# Patient Record
Sex: FEMALE | Race: BLACK OR AFRICAN AMERICAN
[De-identification: names, ages, dates, MRNs, and addresses within clinical notes are randomized per-mention and may not be internally consistent; named-entity substitution may affect disease eponyms.]

---

## 2023-02-06 ENCOUNTER — HOSPITAL ENCOUNTER (OUTPATIENT)
Dept: HOSPITAL 82 - ED | Age: 58
Setting detail: OBSERVATION
LOS: 1 days | Discharge: HOME | DRG: 313 | End: 2023-02-07
Attending: INTERNAL MEDICINE | Admitting: INTERNAL MEDICINE
Payer: SELF-PAY

## 2023-02-06 VITALS — SYSTOLIC BLOOD PRESSURE: 149 MMHG | DIASTOLIC BLOOD PRESSURE: 90 MMHG

## 2023-02-06 VITALS — SYSTOLIC BLOOD PRESSURE: 150 MMHG | DIASTOLIC BLOOD PRESSURE: 77 MMHG

## 2023-02-06 VITALS — DIASTOLIC BLOOD PRESSURE: 86 MMHG | SYSTOLIC BLOOD PRESSURE: 151 MMHG

## 2023-02-06 VITALS — SYSTOLIC BLOOD PRESSURE: 133 MMHG | DIASTOLIC BLOOD PRESSURE: 78 MMHG

## 2023-02-06 VITALS — SYSTOLIC BLOOD PRESSURE: 157 MMHG | DIASTOLIC BLOOD PRESSURE: 98 MMHG

## 2023-02-06 VITALS — SYSTOLIC BLOOD PRESSURE: 177 MMHG | DIASTOLIC BLOOD PRESSURE: 93 MMHG

## 2023-02-06 VITALS — DIASTOLIC BLOOD PRESSURE: 87 MMHG | SYSTOLIC BLOOD PRESSURE: 140 MMHG

## 2023-02-06 VITALS — WEIGHT: 160.94 LBS | HEIGHT: 63 IN | BODY MASS INDEX: 28.52 KG/M2

## 2023-02-06 VITALS — DIASTOLIC BLOOD PRESSURE: 80 MMHG | SYSTOLIC BLOOD PRESSURE: 148 MMHG

## 2023-02-06 VITALS — DIASTOLIC BLOOD PRESSURE: 83 MMHG | SYSTOLIC BLOOD PRESSURE: 145 MMHG

## 2023-02-06 VITALS — DIASTOLIC BLOOD PRESSURE: 72 MMHG | SYSTOLIC BLOOD PRESSURE: 139 MMHG

## 2023-02-06 VITALS — SYSTOLIC BLOOD PRESSURE: 139 MMHG | DIASTOLIC BLOOD PRESSURE: 77 MMHG

## 2023-02-06 VITALS — DIASTOLIC BLOOD PRESSURE: 92 MMHG | SYSTOLIC BLOOD PRESSURE: 141 MMHG

## 2023-02-06 VITALS — DIASTOLIC BLOOD PRESSURE: 69 MMHG | SYSTOLIC BLOOD PRESSURE: 116 MMHG

## 2023-02-06 VITALS — DIASTOLIC BLOOD PRESSURE: 78 MMHG | SYSTOLIC BLOOD PRESSURE: 127 MMHG

## 2023-02-06 DIAGNOSIS — I10: ICD-10-CM

## 2023-02-06 DIAGNOSIS — F41.9: ICD-10-CM

## 2023-02-06 DIAGNOSIS — R07.9: Primary | ICD-10-CM

## 2023-02-06 DIAGNOSIS — J45.909: ICD-10-CM

## 2023-02-06 DIAGNOSIS — Z73.3: ICD-10-CM

## 2023-02-06 LAB
ALBUMIN SERPL-MCNC: 4.5 G/DL (ref 3.2–5)
ALP SERPL-CCNC: 70 U/L (ref 38–126)
ANION GAP SERPL CALCULATED.3IONS-SCNC: 11 MMOL/L
AST SERPL-CCNC: 29 U/L (ref 14–36)
BASOPHILS NFR BLD AUTO: 0.7 % (ref 0–3)
BUN SERPL-MCNC: 16 MG/DL (ref 7–17)
BUN/CREAT SERPL: 20
CHLORIDE SERPL-SCNC: 107 MMOL/L (ref 95–108)
CO2 SERPL-SCNC: 26 MMOL/L (ref 22–30)
CREAT SERPL-MCNC: 0.8 MG/DL (ref 0.5–1)
EOSINOPHIL NFR BLD AUTO: 4.8 % (ref 0–8)
ERYTHROCYTE [DISTWIDTH] IN BLOOD BY AUTOMATED COUNT: 12.8 % (ref 11.5–15.5)
HCT VFR BLD AUTO: 40.3 % (ref 37–47)
HGB BLD-MCNC: 13.2 G/DL (ref 12–16)
IMM GRANULOCYTES NFR BLD: 0.2 % (ref 0–5)
LYMPHOCYTES NFR BLD: 33.6 % (ref 15–41)
MCH RBC QN AUTO: 32.1 PG  CALC (ref 26–32)
MCHC RBC AUTO-ENTMCNC: 32.8 G/DL CAL (ref 32–36)
MCV RBC AUTO: 98.1 FL  CALC (ref 80–100)
MONOCYTES NFR BLD AUTO: 10.7 % (ref 2–13)
NEUTROPHILS # BLD AUTO: 2.1 THOU/UL (ref 2–7.15)
NEUTROPHILS NFR BLD AUTO: 50 % (ref 42–76)
PLATELET # BLD AUTO: 188 THOU/UL (ref 130–400)
POTASSIUM SERPL-SCNC: 4.2 MMOL/L (ref 3.5–5.1)
PROT SERPL-MCNC: 7.3 G/DL (ref 6.3–8.2)
RBC # BLD AUTO: 4.11 MILL/UL (ref 4.2–5.6)
SODIUM SERPL-SCNC: 139 MMOL/L (ref 137–146)

## 2023-02-06 PROCEDURE — G0378 HOSPITAL OBSERVATION PER HR: HCPCS

## 2023-02-06 NOTE — NUR
ER patient is received. report by [facility] ER Nurse. Alert and oriented patient
X3. She does not refer to chest pain at the time of writing this note. He is
oriented on admission, nursing plan and the medications he takes at home are
verified. Safety and fall precautions in [lace. Call light within in reach.

## 2023-02-06 NOTE — NUR
PT DOES NOT KNOW HER HOME MEDICATIONS. STATES SHE TOOK ALL HER MEDICATIONS THIS
AM. CALLED [facility] TO VERIFY HOME MED REC. MED REC COMPLETED.

## 2023-02-06 NOTE — NUR
RECEIVED REPORT FROM DAYSHIFT RN.  PT RESTING IN BED, SITTING ON THE RIGHT
SIDE OF IT, AWAKE, WATCHING TV.  ASSESSMENT COMPLETED.  PT IS A&OX3.  PT
IS ABLE TO COMMUNICATE NEEDS BUT ONLY IN English AND CREOLE.  PT DENIES ANY
PAIN OR DISCOMFORT AT THIS TIME.  ON ROOM AIR, BREATHING EVEN AND UNLABORED.
IV IS PATENT AND FLUSHES WELL.  ON TELEMETRY, MONITORED BY ED.  EDUCATED PT ON
HOW TO USE THE BED AND HELPED HER FIND A English CHANNEL ON TV.  CALL LIGHT
AND BEDSIDE TABLE WITHIN REACH, SAFETY PRECAUTIONS IN PLACE.

## 2023-02-06 NOTE — NUR
PT TRANSFERRED UP TO ROOM 274 VIA WHEELCHAIR, ON TELEMETRY, ACCOMPANIED BY SHARA LOONEY ED TECHKaren

## 2023-02-07 VITALS — DIASTOLIC BLOOD PRESSURE: 82 MMHG | SYSTOLIC BLOOD PRESSURE: 134 MMHG

## 2023-02-07 VITALS — SYSTOLIC BLOOD PRESSURE: 144 MMHG | DIASTOLIC BLOOD PRESSURE: 86 MMHG

## 2023-02-07 VITALS — SYSTOLIC BLOOD PRESSURE: 133 MMHG | DIASTOLIC BLOOD PRESSURE: 85 MMHG

## 2023-02-07 LAB
BILIRUB UR QL STRIP.AUTO: NEGATIVE
CHOLEST SERPL-MCNC: 181 MG/DL (ref 0–199)
CHOLEST/HDLC SERPL: 2.9 {RATIO}
COLOR UR AUTO: YELLOW
GLUCOSE UR STRIP.AUTO-MCNC: NEGATIVE MG/DL
HDLC SERPL-MCNC: 62 MG/DL (ref 39–59)
HGB UR QL STRIP.AUTO: NEGATIVE
KETONES UR STRIP.AUTO-MCNC: NEGATIVE MG/DL
LDLC SERPL CALC-MCNC: 103 MG/DL
LEUKOCYTE ESTERASE UR QL STRIP.AUTO: (no result)
MAGNESIUM SERPL-MCNC: 2.1 MG/DL (ref 1.6–2.3)
NITRITE UR QL STRIP.AUTO: NEGATIVE
PH UR STRIP.AUTO: 6.5 [PH] (ref 4.5–8)
PROT UR QL STRIP.AUTO: NEGATIVE MG/DL
SP GR UR STRIP.AUTO: 1.01
TRIGL SERPL-MCNC: 78 MG/DL (ref 0–149)
UROBILINOGEN UR QL STRIP.AUTO: 0.2 E.U./DL
VLDLC SERPL CALC-MCNC: 16 MG/DL

## 2023-02-07 NOTE — NUR
PT RESTING IN BED IN LOW [place]I-CROWE'S POSIITON, EYES CLOSED.  ON ROOM AIR,
BREATHING EVEN AND UNLABORED.  IV IS PATENT AND FLUSHES WELL.  TELEMETRY IN
PLACE, MONITORED BY ED.  NO SIGNS OF PAIN OR DISTRESS NOTED AT THIS TIME.
CALL LIGHT AND BEDSIDE TABLE WITHIN REACH, SAFETY PRECAUTIONS IN PLACE.

## 2023-02-07 NOTE — NUR
PT RECEIVED IN BED, A/OX3 CALM AND COOPERATIVE. VITALS AS NOTED. DENIES PAIN
SOB OR DISCOMFORT.SATING AT 98% ON RA.
LEFT AC 18G PATENT, SECURED AND SALINE LOCKED. PERIPHERAL
PULSES PALPABLE. CALL LIGHT WITHIN REACH, BED SECURED AND LOCKED. WILL
CONTINUE TO MONITOR.

## 2023-02-07 NOTE — NUR
PT RESTING IN BED IN LOW SEMI-CROWE'S POSIITON,  EYES CLOSED.  BREATHING EVEN
AND UNLABORED.  TELEMETRY IN PLACE.  NO SIGNS OF PAIN OR DISTRESS NOTED AT
THIS TIME.  CALL LIGHT AND BEDSIDE TABLE WITHIN REACH.  SAFETY PRECAUTIONS IN
PLACE.

## 2023-02-07 NOTE — NUR
RECEIVED REPORT FROM HEMA RN - PT OBSERVED SITTING UP IN BED - PT [place]
SPKG ONLY - MY STUDENT SPEAKS [place] AND WAS ABLE TO COMMUNICATE WITH PATIENT
AND CONFIRM SHE HAS NO QUESTIONS AND IS NOT IN PAIN - PT ALSO HAD A DC ORDER
SO I REVIEWED DC INSTRUCTIONS WITH PT AND STUDENT TRANSLATED - PT HAD NO
QUESTIONS OR CONCERNS - PIV REMOVED AND COBAN AND GAUZE APPLIED - PT LEFT IN
STABLE CONDITION VIA WHEELCHAIR WITH ALL PERSONAL BELONGINGS WITH NO HOME MEDS
TO BE PICKED UP